# Patient Record
(demographics unavailable — no encounter records)

---

## 2025-02-14 NOTE — ASSESSMENT
[FreeTextEntry1] : 67 yo male w/o any significant PMH,  daily smoker, presented to the ED with flu-like symptoms, positive for FluA, with a UA demonstrating +32 RBC. Scheduled for urology followup for further evaluation of microhematuria. Patient denies any recent voiding symptoms or history of urinary tract infections. Nocturia x2, not bothersome. Denies ever seeing gross blood in urine. Has not seen physician in 2 years, and has never seen urologist. Currently feeling improved, no fever, chills, myalgias, dysuria, frequency or urgency. No recent gross hematuria. Patient is daily smoker, cut down to 5-6 cigarettes a day, has been daily smoker since 15-15 yo. No known PMH or surgical history. FH +MI in father, mother alive at age 96 with no major problems. No Fhx of genitourinary malignancies or any other malignancies. Recently retired, worked as a doorman. Social wine drinker. Patient also denies any prior prostate cancer screening and denies any FHx of prostate cancer. CTAP in ED demonstrated mild bladder wall thickening, no other  abnormalities, however this was performed without contrast. No h/o nephrolithiasis or stones seen on imaging.   Patient meets criteria for high-risk microhematuria, given age, male sex, daily smoker, and 20+ RBC seen on recent microscopic UA. Discussed evaluation of patients with high-risk microhematuria, including standard CT urogram, urine cytology, and diagnostic cystoscopy. Given no history of gross hematuria, there is approximately a 2-5% chance of identifying underlying pathology on evaluation. Patient and wife understand this possibility and were counseled on the risks, benefits, and alternatives to further screening. They are amenable to undergoing repeat imaging with CT urogram, urine cytology, and in-office cystoscopy in a few weeks.  In addition, given the patient's age, we discussed the role of prostate cancer screening, along with the risks, benefits and alternatives. Patient is amenable to undergo screening via PSA sample today. Will defer VERONICA until day of cystoscopy. Patient is a bike rider, and discussed multiple etiologies for elevations in PSA, and can consider this in future.  Plan: -Urine cytology -CT urogram -in-office cystoscopy in 2 weeks -PSA screening today

## 2025-02-14 NOTE — HISTORY OF PRESENT ILLNESS
[FreeTextEntry1] : 67 yo male w/o any significant PMH,  daily smoker, presented to the ED with flu-like symptoms, positive for FluA, with a UA demonstrating +32 RBC. Scheduled for urology followup for further evaluation of microhematuria. Patient denies any recent voiding symptoms or history of urinary tract infections. Nocturia x2, not bothersome. Denies ever seeing gross blood in urine. Has not seen physician in 2 years, and has never seen urologist. Currently feeling improved, no fever, chills, myalgias, dysuria, frequency or urgency. No recent gross hematuria. Patient is daily smoker, cut down to 5-6 cigarettes a day, has been daily smoker since 15-15 yo. No known PMH or surgical history. FH +MI in father, mother alive at age 96 with no major problems. No Fhx of genitourinary malignancies or any other malignancies. Recently retired, worked as a doorman. Social wine drinker. Patient also denies any prior prostate cancer screening and denies any FHx of prostate cancer. CTAP in ED demonstrated mild bladder wall thickening, no other  abnormalities, however this was performed without contrast. No h/o nephrolithiasis or stones seen on imaging.

## 2025-02-14 NOTE — PHYSICAL EXAM
[General Appearance - Well Developed] : well developed [Normal Appearance] : normal appearance [General Appearance - In No Acute Distress] : no acute distress [Heart Rate And Rhythm] : heart rate and rhythm were normal [] : no respiratory distress [Exaggerated Use Of Accessory Muscles For Inspiration] : no accessory muscle use [Abdomen Soft] : soft [Abdomen Tenderness] : non-tender [Costovertebral Angle Tenderness] : no ~M costovertebral angle tenderness [Normal Station and Gait] : the gait and station were normal for the patient's age [Skin Color & Pigmentation] : normal skin color and pigmentation [No Focal Deficits] : no focal deficits [Oriented To Time, Place, And Person] : oriented to person, place, and time [Affect] : the affect was normal [de-identified] : No suprapubic tenderness

## 2025-02-14 NOTE — PHYSICAL EXAM
[General Appearance - Well Developed] : well developed [Normal Appearance] : normal appearance [General Appearance - In No Acute Distress] : no acute distress [Heart Rate And Rhythm] : heart rate and rhythm were normal [] : no respiratory distress [Exaggerated Use Of Accessory Muscles For Inspiration] : no accessory muscle use [Abdomen Soft] : soft [Abdomen Tenderness] : non-tender [Costovertebral Angle Tenderness] : no ~M costovertebral angle tenderness [Normal Station and Gait] : the gait and station were normal for the patient's age [Skin Color & Pigmentation] : normal skin color and pigmentation [No Focal Deficits] : no focal deficits [Oriented To Time, Place, And Person] : oriented to person, place, and time [Affect] : the affect was normal [de-identified] : No suprapubic tenderness

## 2025-02-14 NOTE — HISTORY OF PRESENT ILLNESS
[FreeTextEntry1] : 67 yo male w/o any significant PMH,  daily smoker, presented to the ED with flu-like symptoms, positive for FluA, with a UA demonstrating +32 RBC. Scheduled for urology followup for further evaluation of microhematuria. Patient denies any recent voiding symptoms or history of urinary tract infections. Nocturia x2, not bothersome. Denies ever seeing gross blood in urine. Has not seen physician in 2 years, and has never seen urologist. Currently feeling improved, no fever, chills, myalgias, dysuria, frequency or urgency. No recent gross hematuria. Patient is daily smoker, cut down to 5-6 cigarettes a day, has been daily smoker since 15-17 yo. No known PMH or surgical history. FH +MI in father, mother alive at age 96 with no major problems. No Fhx of genitourinary malignancies or any other malignancies. Recently retired, worked as a doorman. Social wine drinker. Patient also denies any prior prostate cancer screening and denies any FHx of prostate cancer. CTAP in ED demonstrated mild bladder wall thickening, no other  abnormalities, however this was performed without contrast. No h/o nephrolithiasis or stones seen on imaging.

## 2025-02-14 NOTE — ASSESSMENT
[FreeTextEntry1] : 65 yo male w/o any significant PMH,  daily smoker, presented to the ED with flu-like symptoms, positive for FluA, with a UA demonstrating +32 RBC. Scheduled for urology followup for further evaluation of microhematuria. Patient denies any recent voiding symptoms or history of urinary tract infections. Nocturia x2, not bothersome. Denies ever seeing gross blood in urine. Has not seen physician in 2 years, and has never seen urologist. Currently feeling improved, no fever, chills, myalgias, dysuria, frequency or urgency. No recent gross hematuria. Patient is daily smoker, cut down to 5-6 cigarettes a day, has been daily smoker since 15-17 yo. No known PMH or surgical history. FH +MI in father, mother alive at age 96 with no major problems. No Fhx of genitourinary malignancies or any other malignancies. Recently retired, worked as a doorman. Social wine drinker. Patient also denies any prior prostate cancer screening and denies any FHx of prostate cancer. CTAP in ED demonstrated mild bladder wall thickening, no other  abnormalities, however this was performed without contrast. No h/o nephrolithiasis or stones seen on imaging.   Patient meets criteria for high-risk microhematuria, given age, male sex, daily smoker, and 20+ RBC seen on recent microscopic UA. Discussed evaluation of patients with high-risk microhematuria, including standard CT urogram, urine cytology, and diagnostic cystoscopy. Given no history of gross hematuria, there is approximately a 2-5% chance of identifying underlying pathology on evaluation. Patient and wife understand this possibility and were counseled on the risks, benefits, and alternatives to further screening. They are amenable to undergoing repeat imaging with CT urogram, urine cytology, and in-office cystoscopy in a few weeks.  In addition, given the patient's age, we discussed the role of prostate cancer screening, along with the risks, benefits and alternatives. Patient is amenable to undergo screening via PSA sample today. Will defer VERONICA until day of cystoscopy. Patient is a bike rider, and discussed multiple etiologies for elevations in PSA, and can consider this in future.  Plan: -Urine cytology -CT urogram -in-office cystoscopy in 2 weeks -PSA screening today

## 2025-03-29 NOTE — HISTORY OF PRESENT ILLNESS
[FreeTextEntry1] : 67 yo male w/o any significant PMH,  daily smoker, presented to the ED with flu-like symptoms, positive for FluA, with a UA demonstrating +32 RBC. Scheduled for urology followup for further evaluation of microhematuria. Patient denies any recent voiding symptoms or history of urinary tract infections. Nocturia x2, not bothersome. Denies ever seeing gross blood in urine. Has not seen physician in 2 years, and has never seen urologist. Currently feeling improved, no fever, chills, myalgias, dysuria, frequency or urgency. No recent gross hematuria. Patient is daily smoker, cut down to 5-6 cigarettes a day, has been daily smoker since 15-17 yo. No known PMH or surgical history. FH +MI in father, mother alive at age 96 with no major problems. No Fhx of genitourinary malignancies or any other malignancies. Recently retired, worked as a doorman. Social wine drinker. Patient also denies any prior prostate cancer screening and denies any FHx of prostate cancer. CTAP in ED demonstrated mild bladder wall thickening, no other  abnormalities, however this was performed without contrast. No h/o nephrolithiasis or stones seen on imaging.   3/28/2025: Patient returns for in-office cystoscopy. Denies any interim complaints. Discussed normal PSA, urine cytology and urinalysis from last appointment.  PSA History: 2/14/2025: 0.47 ng/mL  Urine Cytology 2/14/2025: negative for high grade urothelial carcinoma

## 2025-03-29 NOTE — ASSESSMENT
[FreeTextEntry1] : 65 yo male w/o any significant PMH,  daily smoker, presented to the ED with flu-like symptoms, positive for FluA, with a UA demonstrating +32 RBC. Scheduled for urology followup for further evaluation of microhematuria. Patient denies any recent voiding symptoms or history of urinary tract infections. Nocturia x2, not bothersome. Denies ever seeing gross blood in urine. Has not seen physician in 2 years, and has never seen urologist. Currently feeling improved, no fever, chills, myalgias, dysuria, frequency or urgency. No recent gross hematuria. Patient is daily smoker, cut down to 5-6 cigarettes a day, has been daily smoker since 15-15 yo. No known PMH or surgical history. FH +MI in father, mother alive at age 96 with no major problems. No Fhx of genitourinary malignancies or any other malignancies. Recently retired, worked as a doorman. Social wine drinker. Patient also denies any prior prostate cancer screening and denies any FHx of prostate cancer. CTAP in ED demonstrated mild bladder wall thickening, no other  abnormalities, however this was performed without contrast. No h/o nephrolithiasis or stones seen on imaging.   PSA History: 2/14/2025: 0.47 ng/mL  Urine Cytology 2/14/2025: negative for high grade urothelial carcinoma  Cystoscopy 3/28/2025: Cystoscopy performed under standard sterile technique. Anterior urethra notable for several very minor strictures, thin webbing with narrowest point [id number], able to accommodate scope, and poor tissue quality throughout bulbar urethra. Posterior urethra normal, no visible lesions, prostatic urethra measured ~2.5cm in length. Bladder expanded equally in all directions, no evidence of urothelial lesions. Bilateral ureteral orifices visualized in normal anatomic position.  3/28/2025: He is still Pending CT urogram to be performed, which will be ordered. RTC in 1 months to review.  Plan: -CT urogram -RTC in 1 month -PSA screening annually (February 2026)   I was present with the resident during the key portions of the history and exam. I discussed the case with the resident and agree with the findings and plan as documented in the above note. I have edited the note to reflect any changes in plan/assessment as appropriate.  The total time personally spent preparing for this visit (reviewing test results, obtaining external history) and during the visit (ordering tests/medications, spent face to face with the patient / family and counseling them on the above), as well as after the visit (on clinical documentation and coordination with other care providers) was approximately 20 minutes in addition to any procedures.

## 2025-03-29 NOTE — PHYSICAL EXAM
[General Appearance - Well Developed] : well developed [Normal Appearance] : normal appearance [General Appearance - In No Acute Distress] : no acute distress [Heart Rate And Rhythm] : heart rate and rhythm were normal [] : no respiratory distress [Exaggerated Use Of Accessory Muscles For Inspiration] : no accessory muscle use [Abdomen Soft] : soft [Abdomen Tenderness] : non-tender [Costovertebral Angle Tenderness] : no ~M costovertebral angle tenderness [Urethral Meatus] : meatus normal [Penis Abnormality] : normal circumcised penis [Normal Station and Gait] : the gait and station were normal for the patient's age [Skin Color & Pigmentation] : normal skin color and pigmentation [No Focal Deficits] : no focal deficits [Oriented To Time, Place, And Person] : oriented to person, place, and time [Affect] : the affect was normal [de-identified] : No suprapubic tenderness. Cystoscopy performed

## 2025-03-29 NOTE — ASSESSMENT
[FreeTextEntry1] : 65 yo male w/o any significant PMH,  daily smoker, presented to the ED with flu-like symptoms, positive for FluA, with a UA demonstrating +32 RBC. Scheduled for urology followup for further evaluation of microhematuria. Patient denies any recent voiding symptoms or history of urinary tract infections. Nocturia x2, not bothersome. Denies ever seeing gross blood in urine. Has not seen physician in 2 years, and has never seen urologist. Currently feeling improved, no fever, chills, myalgias, dysuria, frequency or urgency. No recent gross hematuria. Patient is daily smoker, cut down to 5-6 cigarettes a day, has been daily smoker since 15-15 yo. No known PMH or surgical history. FH +MI in father, mother alive at age 96 with no major problems. No Fhx of genitourinary malignancies or any other malignancies. Recently retired, worked as a doorman. Social wine drinker. Patient also denies any prior prostate cancer screening and denies any FHx of prostate cancer. CTAP in ED demonstrated mild bladder wall thickening, no other  abnormalities, however this was performed without contrast. No h/o nephrolithiasis or stones seen on imaging.   PSA History: 2/14/2025: 0.47 ng/mL  Urine Cytology 2/14/2025: negative for high grade urothelial carcinoma  Cystoscopy 3/28/2025: Cystoscopy performed under standard sterile technique. Anterior urethra notable for several very minor strictures, thin webbing with narrowest point [id number], able to accommodate scope, and poor tissue quality throughout bulbar urethra. Posterior urethra normal, no visible lesions, prostatic urethra measured ~2.5cm in length. Bladder expanded equally in all directions, no evidence of urothelial lesions. Bilateral ureteral orifices visualized in normal anatomic position.  3/28/2025: He is still Pending CT urogram to be performed, which will be ordered. RTC in 1 months to review.  Plan: -CT urogram -RTC in 1 month -PSA screening annually (February 2026)   I was present with the resident during the key portions of the history and exam. I discussed the case with the resident and agree with the findings and plan as documented in the above note. I have edited the note to reflect any changes in plan/assessment as appropriate.  The total time personally spent preparing for this visit (reviewing test results, obtaining external history) and during the visit (ordering tests/medications, spent face to face with the patient / family and counseling them on the above), as well as after the visit (on clinical documentation and coordination with other care providers) was approximately 20 minutes in addition to any procedures.

## 2025-05-09 NOTE — ASSESSMENT
[FreeTextEntry1] : Pt is a 65 yo gentleman w/o significant PMH, daily smoking history, who was found to have microhematuria with -ve cytology, cysto w/o obvious mass, and clean CTU. Pt has not seen any signs of gross hematuria, and currently has no urologic complaints. Pt has recently retired, and is planning to move to [place] in the near future.   Plan: -UA in 6 months. If still in [place], patient is aware to reach out to clinic to make an appointment. If in [place], pt should find a urologist to f/u with.  -f/u with  clinic as needed

## 2025-05-09 NOTE — END OF VISIT
[] : Resident [FreeTextEntry3] : Agree with above note and plan [Time Spent: ___ minutes] : I have spent [unfilled] minutes of time on the encounter which excludes teaching and separately reported services.

## 2025-05-09 NOTE — HISTORY OF PRESENT ILLNESS
[None] : no symptoms [FreeTextEntry1] : 67 yo male w/o any significant PMH,  daily smoker, presented to the ED with flu-like symptoms, positive for FluA, with a UA demonstrating +32 RBC. Scheduled for urology followup for further evaluation of microhematuria. Patient denies any recent voiding symptoms or history of urinary tract infections. Nocturia x2, not bothersome. Denies ever seeing gross blood in urine. Has not seen physician in 2 years, and has never seen urologist. Currently feeling improved, no fever, chills, myalgias, dysuria, frequency or urgency. No recent gross hematuria. Patient is daily smoker, cut down to 5-6 cigarettes a day, has been daily smoker since 15-15 yo. No known PMH or surgical history. FH +MI in father, mother alive at age 96 with no major problems. No Fhx of genitourinary malignancies or any other malignancies. Recently retired, worked as a doorman. Social wine drinker. Patient also denies any prior prostate cancer screening and denies any FHx of prostate cancer. CTAP in ED demonstrated mild bladder wall thickening, no other  abnormalities, however this was performed without contrast. No h/o nephrolithiasis or stones seen on imaging.   3/28/2025: Patient returns for in-office cystoscopy. Denies any interim complaints. Discussed normal PSA, urine cytology and urinalysis from last appointment.  5/9/2025: Pt returns today following a CT Urogram today to discuss results. While the official CTU read stated no apparent sources for hematuria, on independent review there was initial concern for a possible mass in the R collecting system. We reviewed the imaging again with radiologist Dr. Don Christie, who was convinced the finding was more reflective of a prominent papillae, rather than a mass. Pt reports no current systemic or urinary symptoms, and is otherwise well.    PSA History: 2/14/2025: 0.47 ng/mL  Urine Cytology 2/14/2025: negative for high grade urothelial carcinoma

## 2025-05-09 NOTE — PHYSICAL EXAM
[Normal Appearance] : normal appearance [Well Groomed] : well groomed [General Appearance - In No Acute Distress] : no acute distress [Edema] : no peripheral edema [Abdomen Soft] : soft [Abdomen Tenderness] : non-tender [Normal Station and Gait] : the gait and station were normal for the patient's age [] : no rash [No Focal Deficits] : no focal deficits [Oriented To Time, Place, And Person] : oriented to person, place, and time [Affect] : the affect was normal [Mood] : the mood was normal